# Patient Record
Sex: MALE | URBAN - METROPOLITAN AREA
[De-identification: names, ages, dates, MRNs, and addresses within clinical notes are randomized per-mention and may not be internally consistent; named-entity substitution may affect disease eponyms.]

---

## 2021-10-04 ENCOUNTER — APPOINTMENT (OUTPATIENT)
Dept: URBAN - METROPOLITAN AREA CLINIC 319 | Age: 51
Setting detail: DERMATOLOGY
End: 2021-10-04

## 2021-10-04 PROBLEM — C44.311 BASAL CELL CARCINOMA OF SKIN OF NOSE: Status: ACTIVE | Noted: 2021-10-04

## 2021-10-04 PROCEDURE — 17311 MOHS 1 STAGE H/N/HF/G: CPT

## 2021-10-04 PROCEDURE — 14060 TIS TRNFR E/N/E/L 10 SQ CM/<: CPT

## 2021-10-04 PROCEDURE — OTHER MOHS SURGERY: OTHER

## 2021-10-04 NOTE — HPI: SKIN LESION (BASAL CELL CARCINOMA)
Is This A New Presentation, Or A Follow-Up?: New Basal Cell Carcinoma
Additional History: Patient referred by Wesley Jung MD
When Was Basal Cell Biopsied? (Optional): 9/23/21
Accession # (Optional): [id number]

## 2021-10-04 NOTE — PROCEDURE: MOHS SURGERY
You can access the FollowMyHealth Patient Portal offered by [facility] by registering at the following website: [email or web address]. By joining [email or web address]’s FollowMyHealth portal, you will also be able to view your health information using other applications (apps) compatible with our system. Surgeon Performing Repair (Optional): Leland Gonzales You can access the FollowMyHealth Patient Portal offered by [facility] by registering at the following website: [email or web address]. By joining [employer]’s FollowMyHealth portal, you will also be able to view your health information using other applications (apps) compatible with our system.

## 2021-10-04 NOTE — PROCEDURE: MOHS SURGERY
Thank you for choosing [facility].  You may be receiving a survey in the mail from [facility] regarding your visit today.  Please take a few minutes to complete and return the survey to let us know how we are doing.      Our Clinic hours are:  Mondays    7:20 am - 7 pm  Tues - Fri  7:20 am - 5 pm    Clinic Phone: [phone]    The clinic lab opens at 7:30 am Mon - Fri and appointments are required.    [facility]. [phone]  Monday  8 am - 7pm  Tues - Fri 8 am - 5:30 pm          Quadrant Reporting?: no

## 2024-06-24 NOTE — PROCEDURE: MOHS SURGERY
06/24/24      Nicole Henderson  [address] [place] [address]          Dear Nicole    We would like to share the results of your recent colonoscopy.              Type of polyp(s) removed:    [] Adenomatous: A benign growth in the colon. Most colon cancers start from this type of polyp and removing this polyp is the primary purpose of colonoscopy.  Finding a polyp is not unusual, over the age of 50, 25% of men and 15% of women will have an adenomatous polyp    [x]      Hyperplastic/Other-- a benign growth with no potential to develop into cancer.      Follow-up exam    Your doctor recommends a follow-up colonoscopy in 10 years from the date of your recent examination 6-19-24.                       Why is a follow-up necessary?    Finding an adenomatous polyp means that polyps can develop in other areas of the colon over time. Regular follow up ensures that these polyps are removed before they become cancerous      What should I tell family members?     There is an increased risk of polyps in relatives and they should be encouraged to talk to their doctors about colonoscopy    Sincerely,    Dr. Ricardo Masters  [facility] - Gastroenterology Department  [address] [place] [address] [phone]     Hatchet Flap Text: The defect edges were debeveled with a #15 scalpel blade.  Given the location of the defect, shape of the defect and the proximity to free margins a hatchet flap was deemed most appropriate.  Using a sterile surgical marker, an appropriate hatchet flap was drawn incorporating the defect and placing the expected incisions within the relaxed skin tension lines where possible.    The area thus outlined was incised deep to adipose tissue with a #15 scalpel blade.  The skin margins were undermined to an appropriate distance in all directions utilizing iris scissors.